# Patient Record
Sex: MALE | Race: WHITE | URBAN - METROPOLITAN AREA
[De-identification: names, ages, dates, MRNs, and addresses within clinical notes are randomized per-mention and may not be internally consistent; named-entity substitution may affect disease eponyms.]

---

## 2017-08-23 ENCOUNTER — TRANSFERRED RECORDS (OUTPATIENT)
Dept: HEALTH INFORMATION MANAGEMENT | Facility: CLINIC | Age: 22
End: 2017-08-23

## 2017-08-28 ENCOUNTER — TRANSFERRED RECORDS (OUTPATIENT)
Dept: HEALTH INFORMATION MANAGEMENT | Facility: CLINIC | Age: 22
End: 2017-08-28

## 2017-08-30 ENCOUNTER — TRANSFERRED RECORDS (OUTPATIENT)
Dept: HEALTH INFORMATION MANAGEMENT | Facility: CLINIC | Age: 22
End: 2017-08-30

## 2017-09-05 ENCOUNTER — TRANSFERRED RECORDS (OUTPATIENT)
Dept: HEALTH INFORMATION MANAGEMENT | Facility: CLINIC | Age: 22
End: 2017-09-05

## 2017-09-19 ENCOUNTER — TRANSFERRED RECORDS (OUTPATIENT)
Dept: HEALTH INFORMATION MANAGEMENT | Facility: CLINIC | Age: 22
End: 2017-09-19

## 2017-09-20 ENCOUNTER — THERAPY VISIT (OUTPATIENT)
Dept: PHYSICAL THERAPY | Facility: CLINIC | Age: 22
End: 2017-09-20
Payer: COMMERCIAL

## 2017-09-20 DIAGNOSIS — G25.89 SCAPULAR DYSKINESIS: ICD-10-CM

## 2017-09-20 DIAGNOSIS — M75.101 ROTATOR CUFF SYNDROME OF RIGHT SHOULDER: Primary | ICD-10-CM

## 2017-09-20 PROCEDURE — 97161 PT EVAL LOW COMPLEX 20 MIN: CPT | Mod: GP | Performed by: PHYSICAL THERAPIST

## 2017-09-20 PROCEDURE — 97530 THERAPEUTIC ACTIVITIES: CPT | Mod: GP | Performed by: PHYSICAL THERAPIST

## 2017-09-20 PROCEDURE — 97110 THERAPEUTIC EXERCISES: CPT | Mod: GP | Performed by: PHYSICAL THERAPIST

## 2017-09-20 NOTE — LETTER
[facility]  [address] [address]  [phone]    2017    Re: Hamilton Lira   :   1995  MRN:  [id number]   REFERRING PHYSICIAN:   [facility]    Date of Initial Evaluation:  2017  Visits:  Rxs Used: 1  Reason for Referral:     Rotator cuff syndrome of right shoulder  Scapular dyskinesis    EVALUATION SUMMARY    Subjective:  Patient is a 22 year old male presenting with rehab right shoulder hpi. The history is provided by the patient.   Hamilton Lira is a 22 year old male with a right shoulder condition.  Condition occurred with:  Unknown cause.  Condition occurred: for unknown reasons.  This is a chronic condition  Been problematic for 5 years. MD referral 17. PRP injection 17.    Patient reports pain:  Anterior and lateral.    Pain is described as aching and is intermittent and reported as 6/10 (prior to PRP injection but no pain today).  Associated symptoms:  Loss of motion/stiffness.   Symptoms are exacerbated by using arm overhead and lifting and relieved by rest (PRP injection).  Since onset symptoms are gradually improving.  Special tests:  MRI.  Previous treatment includes physical therapy.  There was moderate improvement following previous treatment.  General health as reported by patient is excellent.  Pertinent medical history includes:  None.  Medical allergies: no.  Other surgeries include:  None reported.  Current medications:  None as reported by the patient.  Current occupation is Student.  Patient is working in normal job without restrictions.  Primary job tasks include:  Prolonged sitting.  Barriers include:  None as reported by the patient.  Red flags:  None as reported by the patient.    Objective:  Standing Alignment:    Shoulder/UE:  Elevated scapula R  Shoulder Evaluation:  ROM:  AROM:  : Scapular dyskinesis on the R. Elevation and upward rotation of the  scapula used to compensate for lack of UE elevation.  Flexion:  Left:  170    Right:  60  Abduction:  Left: 175   Right:  115  PROM:    Flexion:  Right: 150    Internal Rotation:  Left:  50    Right:  45  Re: Hamilton Lira   :   1995    External Rotation:  Left:  95    Right:  90  Strength:  : No scapular winging noted during counter top push up.  Flexion: Left:4+/5   Pain:    Right: 4-/5      Pain:  ++  Extension:  Left: 4+/5    Pain:    Right: 4-/5    Pain:  Abduction:  Left: 4+/5  Pain:    Right: 3/5      Pain:+++  Internal Rotation:  Left:4/5     Pain:    Right: 4/5     Pain:  External Rotation:   Left:4+/5     Pain:   Right:4-/5      Pain:+    Elbow Flexion:  Left:4+/5     Pain:    Right:4+/5     Pain:  Elbow Extension:  Left:4+/5     Pain:    Right:4+/5     Pain:  Special Tests:    Left shoulder negative for the following special tests:  Impingement and Rotator cuff tear  Right shoulder positive for the following special tests:Impingement and Rotator cuff tear  Palpation:  Palpation assessed shoulder: Soft tissue restrictions in the Right teres major.  Right shoulder tenderness present at: Acrimioclavicular; Supraspinatus; Teres Minor; Levator; Upper Trap and Bicipital Groove    Assessment/Plan:    Patient is a 22 year old male with right side shoulder complaints.    Patient has the following significant findings with corresponding treatment plan.                Diagnosis 1:  Right rotator cuff syndrome and scapular dyskinesis  Pain -  hot/cold therapy, US, electric stimulation, manual therapy, splint/taping/bracing/orthotics, self management, education and home program  Decreased ROM/flexibility - manual therapy, therapeutic exercise, therapeutic activity and home program  Decreased strength - therapeutic exercise, therapeutic activities and home program  Impaired muscle performance - electric stimulation, neuro re-education and home program  Decreased function - therapeutic activities and home  program    Therapy Evaluation Codes:   1) History comprised of:   Personal factors that impact the plan of care:      Cautious Nature.    Comorbidity factors that impact the plan of care are:      None.     Medications impacting care: None.  2) Examination of Body Systems comprised of:   Body structures and functions that impact the plan of care:      Shoulder.   Activity limitations that impact the plan of care are:      Lifting, Sports and Throwing.  3) Clinical presentation characteristics are:   Stable/Uncomplicated.  4) Decision-Making    Low complexity using standardized patient assessment instrument and/or measureable assessment of functional outcome.  Cumulative Therapy Evaluation is: Low complexity.  Previous and current functional limitations:  (See Goal Flow Sheet for this information)    Short term and Long term goals: (See Goal Flow Sheet for this information)   Re: Hamilton Lira   :   1995    Communication ability:  Patient appears to be able to clearly communicate and understand verbal and written communication and follow directions correctly.  Treatment Explanation - The following has been discussed with the patient:   RX ordered/plan of care  Anticipated outcomes  Possible risks and side effects  This patient would benefit from PT intervention to resume normal activities.   Rehab potential is fair.  Frequency:  2 X week, once daily tapering to 1 x week  Duration:  for 8 weeks  Discharge Plan:  Achieve all LTG.  Independent in home treatment program.  Reach maximal therapeutic benefit.      Thank you for your referral.    INQUIRIES  Therapist: Lilly TELLES [facility] PHYSICAL THERAPY [address] [address]  Phone: [phone]

## 2017-09-20 NOTE — PROGRESS NOTES
Subjective:    Patient is a 22 year old male presenting with rehab right shoulder hpi. The history is provided by the patient.   Hamilton Lira is a 22 year old male with a right shoulder condition.  Condition occurred with:  Unknown cause.  Condition occurred: for unknown reasons.  This is a chronic condition  Been problematic for 5 years. MD referral 8/30/17. PRP injection 9/6/17.  .    Patient reports pain:  Anterior and lateral.    Pain is described as aching and is intermittent and reported as 6/10 (prior to PRP injection but no pain today).  Associated symptoms:  Loss of motion/stiffness.   Symptoms are exacerbated by using arm overhead and lifting and relieved by rest (PRP injection).  Since onset symptoms are gradually improving.  Special tests:  MRI.  Previous treatment includes physical therapy.  There was moderate improvement following previous treatment.  General health as reported by patient is excellent.  Pertinent medical history includes:  None.  Medical allergies: no.  Other surgeries include:  None reported.  Current medications:  None as reported by the patient.  Current occupation is Student  .  Patient is working in normal job without restrictions.  Primary job tasks include:  Prolonged sitting.    Barriers include:  None as reported by the patient.    Red flags:  None as reported by the patient.                        Objective:    Standing Alignment:      Shoulder/UE:  Elevated scapula R                                       Shoulder Evaluation:  ROM:  AROM:  : Scapular dyskinesis on the R. Elevation and upward rotation of the scapula used to compensate for lack of UE elevation.  Flexion:  Left:  170    Right:  60    Abduction:  Left: 175   Right:  115                      PROM:    Flexion:  Right: 150          Internal Rotation:  Left:  50    Right:  45  External Rotation:  Left:  95    Right:  90                    Strength:  : No scapular winging noted during counter top push  up.  Flexion: Left:4+/5   Pain:    Right: 4-/5      Pain:  ++  Extension:  Left: 4+/5    Pain:    Right: 4-/5    Pain:  Abduction:  Left: 4+/5  Pain:    Right: 3/5      Pain:+++    Internal Rotation:  Left:4/5     Pain:    Right: 4/5     Pain:  External Rotation:   Left:4+/5     Pain:   Right:4-/5      Pain:+        Elbow Flexion:  Left:4+/5     Pain:    Right:4+/5     Pain:  Elbow Extension:  Left:4+/5     Pain:    Right:4+/5     Pain:    Special Tests:      Left shoulder negative for the following special tests:  Impingement and Rotator cuff tear  Right shoulder positive for the following special tests:Impingement and Rotator cuff tear  Palpation:  Palpation assessed shoulder: Soft tissue restrictions in the Right teres major.    Right shoulder tenderness present at: Acrimioclavicular; Supraspinatus; Teres Minor; Levator; Upper Trap and Bicipital Groove                                     General     ROS    Assessment/Plan:      Patient is a 22 year old male with right side shoulder complaints.    Patient has the following significant findings with corresponding treatment plan.                Diagnosis 1:  Right rotator cuff syndrome and scapular dyskinesis  Pain -  hot/cold therapy, US, electric stimulation, manual therapy, splint/taping/bracing/orthotics, self management, education and home program  Decreased ROM/flexibility - manual therapy, therapeutic exercise, therapeutic activity and home program  Decreased strength - therapeutic exercise, therapeutic activities and home program  Impaired muscle performance - electric stimulation, neuro re-education and home program  Decreased function - therapeutic activities and home program    Therapy Evaluation Codes:   1) History comprised of:   Personal factors that impact the plan of care:      Cautious Nature.    Comorbidity factors that impact the plan of care are:      None.     Medications impacting care: None.  2) Examination of Body Systems comprised of:   Body  structures and functions that impact the plan of care:      Shoulder.   Activity limitations that impact the plan of care are:      Lifting, Sports and Throwing.  3) Clinical presentation characteristics are:   Stable/Uncomplicated.  4) Decision-Making    Low complexity using standardized patient assessment instrument and/or measureable assessment of functional outcome.  Cumulative Therapy Evaluation is: Low complexity.    Previous and current functional limitations:  (See Goal Flow Sheet for this information)    Short term and Long term goals: (See Goal Flow Sheet for this information)     Communication ability:  Patient appears to be able to clearly communicate and understand verbal and written communication and follow directions correctly.  Treatment Explanation - The following has been discussed with the patient:   RX ordered/plan of care  Anticipated outcomes  Possible risks and side effects  This patient would benefit from PT intervention to resume normal activities.   Rehab potential is fair.    Frequency:  2 X week, once daily tapering to 1 x week  Duration:  for 8 weeks  Discharge Plan:  Achieve all LTG.  Independent in home treatment program.  Reach maximal therapeutic benefit.    Please refer to the daily flowsheet for treatment today, total treatment time and time spent performing 1:1 timed codes.

## 2017-09-20 NOTE — MR AVS SNAPSHOT
After Visit Summary   9/20/2017    Hamilton Lira    MRN: [id number]           Patient Information     Date Of Birth          1995        Visit Information        Provider Department      9/20/2017 4:40 PM [facility]        Today's Diagnoses     Rotator cuff syndrome of right shoulder    -  1    Scapular dyskinesis           Follow-ups after your visit        Your next 10 appointments already scheduled     Sep 25, 2017  4:30 PM CDT   [facility] Extremity with [facility] ([facility] [place])    [address] [place] [id number] [phone]            Sep 27, 2017  2:00 PM CDT   [facility] Extremity with [facility] ([facility] [place])    [address] [place] [address] [id number] [phone]            Oct 06, 2017  4:50 PM CDT   [facility]RI Extremity with [facility] ([facility])    [address] [place] [address] [id number] [phone]            Oct 13, 2017  4:50 PM CDT   [facility] Extremity with [facility] ([facility])    [address] [place] [address] [id number] [phone]            Oct 27, 2017  4:50 PM CDT   [facility]RI Extremity with [facility] ([facility] [place])    [address] [place] [address] [id number] [phone]            Nov 03, 2017  4:50 PM CDT   [facility]RI Extremity with [facility] ([facility] [place])    [address] [place] [address] [id number] [phone]            Nov 10, 2017  4:50 PM CST   BRI Extremity with [facility] ([facility])    [address] [facility] [facility] [address] [facility] [address] [phone]              Who to contact     If you have questions or need follow up information about today's clinic visit or your schedule please contact [facility] directly at [phone].  Normal or non-critical lab and imaging results will be communicated to you by MyChart, letter or phone within 4 business days after the clinic has received the results. If you do not hear from us within 7 days, please contact the clinic through MyChart or phone. If you have a critical or abnormal lab result, we will notify you by phone as soon as possible.  Submit refill requests through [email or web address] or call your pharmacy and they will forward the refill request to us. Please allow 3 business days for your refill to be completed.          Additional Information About Your Visit        MyChart Information     [email or web address] lets you send messages to your doctor, view your test results, renew your prescriptions, schedule appointments and more. To sign up, go to [email or web address] . Click on \"Log in\" on the left side of the screen, which will take you to the Welcome page. Then click on \"Sign up Now\" on the right side of the page.     You will be asked to enter the access code listed below, as well as some personal information. Please follow the directions to create your username and password.     Your access code is: [email or web address]  Expires: 2017  6:30 AM     Your access code will  in 90 days. If you need help or a new code, please call your [place] clinic or [phone].        Care EveryWhere ID     This is your Care EveryWhere ID. This could be used by other organizations to access your [place] medical records  [id number]         Blood Pressure from Last 3 Encounters:   13 96/60   12 110/62   02/12/10 96/56    Weight from Last 3 Encounters:   13 56.7 kg (125 lb) (24 %)*   12 52.8 " kg (116 lb 4.8 oz) (21 %)*   02/12/10 36.3 kg (80 lb) (6 %)*     * Growth percentiles are based on CDC 2-20 Years data.              We Performed the Following     HC PT EVAL, LOW COMPLEXITY     BRI INITIAL EVAL REPORT     THERAPEUTIC ACTIVITIES     THERAPEUTIC EXERCISES        Primary Care Provider Office Phone # Fax #    Lynnette Triplett [phone] [address] [place] [address]        Equal Access to Services     [facility]: Hadii aad ku hadasho Soomaali, waaxda luqadaha, qaybta kaalmada adeegyada, waxay idiin hayaan adeeg kharash la'aan . So [facility] [phone].    ATENCIÓN: Si habla español, tiene a lucia disposición servicios gratuitos de asistencia lingüística. Llame al [phone].    We comply with applicable federal civil rights laws and [place] laws. We do not discriminate on the basis of race, color, national origin, age, disability sex, sexual orientation or gender identity.            Thank you!     Thank you for choosing [facility] PHYSICAL THERAPY [facility]  for your care. Our goal is always to provide you with excellent care. Hearing back from our patients is one way we can continue to improve our services. Please take a few minutes to complete the written survey that you may receive in the mail after your visit with us. Thank you!             Your Updated Medication List - Protect others around you: Learn how to safely use, store and throw away your medicines at www.disposemymeds.org.          This list is accurate as of: 9/20/17  9:41 PM.  Always use your most recent med list.                   Brand Name Dispense Instructions for use Diagnosis    NO ACTIVE MEDICATIONS

## 2017-09-25 ENCOUNTER — THERAPY VISIT (OUTPATIENT)
Dept: PHYSICAL THERAPY | Facility: CLINIC | Age: 22
End: 2017-09-25
Payer: COMMERCIAL

## 2017-09-25 DIAGNOSIS — G25.89 SCAPULAR DYSKINESIS: ICD-10-CM

## 2017-09-25 DIAGNOSIS — M75.101 ROTATOR CUFF SYNDROME OF RIGHT SHOULDER: ICD-10-CM

## 2017-09-25 PROCEDURE — 97110 THERAPEUTIC EXERCISES: CPT | Mod: GP | Performed by: PHYSICAL THERAPIST

## 2017-09-25 PROCEDURE — 97112 NEUROMUSCULAR REEDUCATION: CPT | Mod: GP | Performed by: PHYSICAL THERAPIST

## 2017-09-27 ENCOUNTER — OFFICE VISIT (OUTPATIENT)
Dept: OPHTHALMOLOGY | Facility: CLINIC | Age: 22
End: 2017-09-27
Attending: OPHTHALMOLOGY
Payer: COMMERCIAL

## 2017-09-27 DIAGNOSIS — H04.129 DRY EYE: Primary | ICD-10-CM

## 2017-09-27 PROCEDURE — 99212 OFFICE O/P EST SF 10 MIN: CPT | Mod: ZF

## 2017-09-27 ASSESSMENT — VISUAL ACUITY
OD_SC: 20/20
OS_SC: 20/20
METHOD: SNELLEN - LINEAR

## 2017-09-27 ASSESSMENT — CONF VISUAL FIELD
OD_NORMAL: 1
OS_NORMAL: 1

## 2017-09-27 ASSESSMENT — SLIT LAMP EXAM - LIDS
COMMENTS: MILD SCURF
COMMENTS: MILD SCURF

## 2017-09-27 ASSESSMENT — CUP TO DISC RATIO
OD_RATIO: 0.4
OS_RATIO: 0.35

## 2017-09-27 ASSESSMENT — EXTERNAL EXAM - LEFT EYE: OS_EXAM: NORMAL

## 2017-09-27 ASSESSMENT — TONOMETRY
OS_IOP_MMHG: 14
IOP_METHOD: ICARE
OD_IOP_MMHG: 15

## 2017-09-27 ASSESSMENT — EXTERNAL EXAM - RIGHT EYE: OD_EXAM: NORMAL

## 2017-09-27 NOTE — MR AVS SNAPSHOT
After Visit Summary   9/27/2017    Hamilton Lira    MRN: [id number]           Patient Information     Date Of Birth          1995        Visit Information        Provider Department      9/27/2017 1:45 PM Mara Bradley [facility]        Today's Diagnoses     Dry eye    -  1      Care Instructions    Start warm compresses daily   Start artificial tears eyedrops - 1 drop in each eye every morning            Follow-ups after your visit        Follow-up notes from your care team     Return if symptoms worsen or fail to improve.      Your next 10 appointments already scheduled     Oct 02, 2017  1:50 PM CDT   BRI Extremity with [facility] ([facility])    [address] [place] [address] [phone]            Oct 06, 2017  4:50 PM CDT   [facility]RI Extremity with [facility] ([facility] [place])    [address] [place] [address] [phone]            Oct 13, 2017  4:50 PM CDT   [facility]RI Extremity with [facility] ([facility] [place])    [address] [place] [phone] [phone]            Oct 18, 2017  4:40 PM CDT   BRI Extremity with [facility] ([facility] [place])    [address] [place] [address] [phone] [phone]            Oct 27, 2017  4:50 PM CDT   BRI Extremity with [facility] ([facility] [place])    [address] [place] [address] [phone] [phone]            Nov 03, 2017  4:50 PM CDT   BRI Extremity with [facility] ([facility] [place])    [address] [place] [address]  [facility] [address] [phone]            Nov 10, 2017  4:50 PM CST   BRI Extremity with Ney Jaimes PT   [facility] ([facility] [place] [facility])    [address] [facility] [address] [phone]              Who to contact     Please call your clinic at [phone] to:    Ask questions about your health    Make or cancel appointments    Discuss your medicines    Learn about your test results    Speak to your doctor   If you have compliments or concerns about an experience at your clinic, or if you wish to file a complaint, please contact [facility] Patient Relations at [phone] or email us at [email or web address]         Additional Information About Your Visit        Donordonut Information     Donordonut is an electronic gateway that provides easy, online access to your medical records. With Donordonut, you can request a clinic appointment, read your test results, renew a prescription or communicate with your care team.     To sign up for Donordonut visit the website at [email or web address]   You will be asked to enter the access code listed below, as well as some personal information. Please follow the directions to create your username and password.     Your access code is: [email or web address]  Expires: 2017  6:30 AM     Your access code will  in 90 days. If you need help or a new code, please contact your [facility] or call [phone] for assistance.        Care EveryWhere ID     This is your Care EveryWhere ID. This could be used by other organizations to access your [place] medical records  [id number]         Blood Pressure from Last 3 Encounters:   13 96/60   12 110/62   02/12/10 96/56    Weight from Last 3 Encounters:   13 56.7 kg (125 lb) (24 %)*   12 52.8 kg (116 lb 4.8 oz) (21 %)*   02/12/10 36.3 kg (80 lb) (6 %)*     * Growth  percentiles are based on [facility] 2-20 Years data.              Today, you had the following     No orders found for display       Primary Care Provider Office Phone # Fax #    Lynnette Triplett [phone]       [facility] [place] [address]        Equal Access to Services     [facility]: Hadii aad ku hadasho Soomaali, waaxda luqadaha, qaybta kaalmada adeegyada, waxay idiin hayaan adeeg erin laaixaankit . So [facility] [phone].    ATENCIÓN: Si habla español, tiene a lucia disposición servicios gratuitos de asistencia lingüística. Llame al [phone].    We comply with applicable federal civil rights laws and [place] laws. We do not discriminate on the basis of race, color, national origin, age, disability, sex, sexual orientation, or gender identity.            Thank you!     Thank you for choosing E[facility] CLINIC  for your care. Our goal is always to provide you with excellent care. Hearing back from our patients is one way we can continue to improve our services. Please take a few minutes to complete the written survey that you may receive in the mail after your visit with us. Thank you!             Your Updated Medication List - Protect others around you: Learn how to safely use, store and throw away your medicines at www.disposemymeds.org.          This list is accurate as of: 9/27/17 11:59 PM.  Always use your most recent med list.                   Brand Name Dispense Instructions for use Diagnosis    NO ACTIVE MEDICATIONS

## 2017-09-27 NOTE — NURSING NOTE
Chief Complaints and History of Present Illnesses   Patient presents with     Consult For     Irritation LE     HPI    Affected eye(s):  Left   Symptoms:        Duration:  7 months   Frequency:  Constant       Do you have eye pain now?:  No      Comments:  Pt. States that he has had intermittent irritation LE.  Has happened about 3 times over the last 7 months.  Was seen at one clinic for FB sensation where he had his LE flushed out.  Did get a bug in LE 2 months ago and did have small corneal abrasion.   Was treated with an antibiotic drop.  Was doing well until recently having more discomfort again.   LE seems to be more irritated when waking up.  Debbie Cowan COT 2:01 PM September 27, 2017

## 2017-09-27 NOTE — PROGRESS NOTES
CC: left eye irritation/discomfort    HPI: Hamilton Lira is a 22 year old male who presents for evaluation of irritation/discomfort in the left eye. He reports 3 episodes of left eye irritation over the past 3 months. The first episode of irritaiton occurred 7mo ago without inciting incident and resolved after irrigation. The next episode was 2-3mo ago after a bug flew into the left eye while he was biking. He was seen in an urgent clinic and prescribed an antibiotic drop for a corneal abrasion. The latest episode was yesterday, when he noted that the left eye felt irritated without any inciting incident. He feels that the timing of when he washes his face throughout the day may be contributing to his symptoms. The left eye continues to feel irritated today.    PMH:  None    POHx:  None    Current Eye Medications:   None    Assessment & Plan   Hamilton Lira is a 22 year old male with the following diagnoses:   1. Dry eye       No fb noted with lid eversion in the left eye  Start warm compresses daily  Start artificial tears each morning in both eyes       RTC: prn for new or worsening symptoms    Mara Bradley MD   PGY-3 Ophthalmology    Teaching statement:  Complete documentation of historical and exam elements from today's encounter can be found in the full encounter summary report (not reduplicated in this progress note). I personally obtained the chief complaint(s) and history of present illness.  I confirmed and edited as necessary the review of systems, past medical/surgical history, family history, social history, and examination findings as documented by others; and I examined the patient myself. I personally reviewed the relevant tests, images, and reports as documented above.     I formulated and edited as necessary the assessment and plan and discussed the findings and management plan with the patient and family.    Sophie Carl MD  Comprehensive Ophthalmology & Ocular  Pathology  Department of Ophthalmology and Visual Neurosciences  [email or web address]  Pager [phone]

## 2017-09-27 NOTE — PATIENT INSTRUCTIONS
Start warm compresses daily   Start artificial tears eyedrops - 1 drop in each eye every morning

## 2017-10-02 ENCOUNTER — THERAPY VISIT (OUTPATIENT)
Dept: PHYSICAL THERAPY | Facility: CLINIC | Age: 22
End: 2017-10-02
Payer: COMMERCIAL

## 2017-10-02 DIAGNOSIS — M75.101 ROTATOR CUFF SYNDROME OF RIGHT SHOULDER: ICD-10-CM

## 2017-10-02 DIAGNOSIS — G25.89 SCAPULAR DYSKINESIS: ICD-10-CM

## 2017-10-02 PROCEDURE — 97112 NEUROMUSCULAR REEDUCATION: CPT | Mod: GP | Performed by: PHYSICAL THERAPIST

## 2017-10-02 PROCEDURE — 97110 THERAPEUTIC EXERCISES: CPT | Mod: GP | Performed by: PHYSICAL THERAPIST

## 2017-10-06 ENCOUNTER — THERAPY VISIT (OUTPATIENT)
Dept: PHYSICAL THERAPY | Facility: CLINIC | Age: 22
End: 2017-10-06
Payer: COMMERCIAL

## 2017-10-06 DIAGNOSIS — G25.89 SCAPULAR DYSKINESIS: ICD-10-CM

## 2017-10-06 DIAGNOSIS — M75.101 ROTATOR CUFF SYNDROME OF RIGHT SHOULDER: ICD-10-CM

## 2017-10-06 PROCEDURE — 97112 NEUROMUSCULAR REEDUCATION: CPT | Mod: GP

## 2017-10-06 PROCEDURE — 97110 THERAPEUTIC EXERCISES: CPT | Mod: GP

## 2017-10-13 ENCOUNTER — THERAPY VISIT (OUTPATIENT)
Dept: PHYSICAL THERAPY | Facility: CLINIC | Age: 22
End: 2017-10-13
Payer: COMMERCIAL

## 2017-10-13 DIAGNOSIS — G25.89 SCAPULAR DYSKINESIS: ICD-10-CM

## 2017-10-13 DIAGNOSIS — M75.101 ROTATOR CUFF SYNDROME OF RIGHT SHOULDER: ICD-10-CM

## 2017-10-13 PROCEDURE — 97110 THERAPEUTIC EXERCISES: CPT | Mod: GP

## 2017-10-13 PROCEDURE — 97112 NEUROMUSCULAR REEDUCATION: CPT | Mod: GP

## 2017-10-18 ENCOUNTER — THERAPY VISIT (OUTPATIENT)
Dept: PHYSICAL THERAPY | Facility: CLINIC | Age: 22
End: 2017-10-18
Payer: COMMERCIAL

## 2017-10-18 DIAGNOSIS — M75.101 ROTATOR CUFF SYNDROME OF RIGHT SHOULDER: ICD-10-CM

## 2017-10-18 DIAGNOSIS — G25.89 SCAPULAR DYSKINESIS: ICD-10-CM

## 2017-10-18 PROCEDURE — 97112 NEUROMUSCULAR REEDUCATION: CPT | Mod: GP | Performed by: PHYSICAL THERAPIST

## 2017-10-18 PROCEDURE — 97110 THERAPEUTIC EXERCISES: CPT | Mod: GP | Performed by: PHYSICAL THERAPIST

## 2017-10-27 ENCOUNTER — THERAPY VISIT (OUTPATIENT)
Dept: PHYSICAL THERAPY | Facility: CLINIC | Age: 22
End: 2017-10-27
Payer: COMMERCIAL

## 2017-10-27 DIAGNOSIS — G25.89 SCAPULAR DYSKINESIS: ICD-10-CM

## 2017-10-27 DIAGNOSIS — M75.101 ROTATOR CUFF SYNDROME OF RIGHT SHOULDER: ICD-10-CM

## 2017-10-27 PROCEDURE — 97112 NEUROMUSCULAR REEDUCATION: CPT | Mod: GP

## 2017-10-27 PROCEDURE — 97110 THERAPEUTIC EXERCISES: CPT | Mod: GP

## 2017-11-10 ENCOUNTER — THERAPY VISIT (OUTPATIENT)
Dept: PHYSICAL THERAPY | Facility: CLINIC | Age: 22
End: 2017-11-10
Payer: COMMERCIAL

## 2017-11-10 DIAGNOSIS — M75.101 ROTATOR CUFF SYNDROME OF RIGHT SHOULDER: ICD-10-CM

## 2017-11-10 DIAGNOSIS — G25.89 SCAPULAR DYSKINESIS: ICD-10-CM

## 2017-11-10 PROCEDURE — 97110 THERAPEUTIC EXERCISES: CPT | Mod: GP

## 2017-11-10 PROCEDURE — 97112 NEUROMUSCULAR REEDUCATION: CPT | Mod: GP

## 2017-12-01 ENCOUNTER — THERAPY VISIT (OUTPATIENT)
Dept: PHYSICAL THERAPY | Facility: CLINIC | Age: 22
End: 2017-12-01
Payer: COMMERCIAL

## 2017-12-01 DIAGNOSIS — M75.101 ROTATOR CUFF SYNDROME OF RIGHT SHOULDER: ICD-10-CM

## 2017-12-01 DIAGNOSIS — G25.89 SCAPULAR DYSKINESIS: ICD-10-CM

## 2017-12-01 PROCEDURE — 97110 THERAPEUTIC EXERCISES: CPT | Mod: GP

## 2017-12-01 PROCEDURE — 97112 NEUROMUSCULAR REEDUCATION: CPT | Mod: GP
